# Patient Record
Sex: MALE | Race: WHITE | NOT HISPANIC OR LATINO | URBAN - METROPOLITAN AREA
[De-identification: names, ages, dates, MRNs, and addresses within clinical notes are randomized per-mention and may not be internally consistent; named-entity substitution may affect disease eponyms.]

---

## 2021-01-28 ENCOUNTER — TRANSFERRED RECORDS (OUTPATIENT)
Dept: HEALTH INFORMATION MANAGEMENT | Facility: CLINIC | Age: 71
End: 2021-01-28

## 2021-02-04 ENCOUNTER — TRANSCRIBE ORDERS (OUTPATIENT)
Dept: OTHER | Age: 71
End: 2021-02-04

## 2021-02-04 DIAGNOSIS — D03.9 LENTIGO MALIGNA (H): Primary | ICD-10-CM

## 2021-02-08 ENCOUNTER — TELEPHONE (OUTPATIENT)
Dept: DERMATOLOGY | Facility: CLINIC | Age: 71
End: 2021-02-08

## 2021-02-10 ENCOUNTER — DOCUMENTATION ONLY (OUTPATIENT)
Dept: CARE COORDINATION | Facility: CLINIC | Age: 71
End: 2021-02-10

## 2021-02-11 NOTE — TELEPHONE ENCOUNTER
FUTURE VISIT INFORMATION      FUTURE VISIT INFORMATION:    Date: 2.16.21    Time: 3:30    Location: Telephone  REFERRAL INFORMATION:    Referring provider:  Dr. Marni Tom    Referring providers clinic:  [facility]    Reason for visit/diagnosis  lentigo maligna    RECORDS REQUESTED FROM:       Clinic name Comments Records Status Photos Status   [facility] 1.28.21  Dr. Angela  Path # [id number] CE Received- sent to scanning.                                   Action 2.11.21 jm   Action Taken Faxed photo request to [facility] at [phone].     Action 2.12.21 jm   Action Taken Received photo. Sent to scanning. Also sent to Sandrita.

## 2021-02-16 ENCOUNTER — VIRTUAL VISIT (OUTPATIENT)
Dept: DERMATOLOGY | Facility: CLINIC | Age: 71
End: 2021-02-16
Payer: COMMERCIAL

## 2021-02-16 ENCOUNTER — PRE VISIT (OUTPATIENT)
Dept: DERMATOLOGY | Facility: CLINIC | Age: 71
End: 2021-02-16

## 2021-02-16 DIAGNOSIS — D03.39 LENTIGO MALIGNA OF RIGHT CHEEK (H): Primary | ICD-10-CM

## 2021-02-16 PROCEDURE — 99204 OFFICE O/P NEW MOD 45 MIN: CPT | Mod: TEL | Performed by: DERMATOLOGY

## 2021-02-16 RX ORDER — PHENYTOIN SODIUM 100 MG/1
500 CAPSULE, EXTENDED RELEASE ORAL DAILY
COMMUNITY
Start: 2020-12-01

## 2021-02-16 RX ORDER — DULOXETIN HYDROCHLORIDE 60 MG/1
60 CAPSULE, DELAYED RELEASE ORAL 2 TIMES DAILY
COMMUNITY
Start: 2020-12-01

## 2021-02-16 RX ORDER — ATORVASTATIN CALCIUM 80 MG/1
80 TABLET, FILM COATED ORAL DAILY
COMMUNITY
Start: 2020-12-01

## 2021-02-16 RX ORDER — ESCITALOPRAM OXALATE 20 MG/1
20 TABLET ORAL DAILY
COMMUNITY
Start: 2020-12-01

## 2021-02-16 RX ORDER — CLONAZEPAM 0.5 MG/1
0.5 TABLET ORAL 2 TIMES DAILY
COMMUNITY
Start: 2021-02-16

## 2021-02-16 RX ORDER — LISINOPRIL 10 MG/1
5 TABLET ORAL DAILY
COMMUNITY
Start: 2020-12-01

## 2021-02-16 NOTE — PROGRESS NOTES
[facility] Dermatology Note  Encounter Date: Feb 16, 2021  Store-and-Forward and Telephone ([phone] ). Location of teledermatologist: [facility] [place].  Start time: 1610. End time: 1620.    Dermatology Problem List:  1. 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   - Recommended micrographic surgery, patient agrees to proceed with scheduling      Follow-up: MMS in 1-2 weeks    Staff and Fellow:     Dax Spaulding MD  PGY-6    Micrographic Surgery and Dermatologic Oncology Fellow  February 16, 2021      ____________________________________________    CC: Derm Problem (Consult Lentigo maligna right cheek)    HPI:  Mr. Félix Martin is a(n) 70 year old male who presents today for consultation regarding lentigo maligna of the right preauricular cheek. He is being referred for MMS.     Patient is otherwise feeling well, without additional concerns.    Labs:  NA    Physical Exam:  No photos available.     Medications:  Current Outpatient Medications   Medication     Aspirin Buf,CaCarb-MgCarb-MgO, 81 MG TABS     atorvastatin (LIPITOR) 80 MG tablet     cholecalciferol (VITAMIN D3) 25 mcg (1000 units) capsule     clonazePAM (KLONOPIN) 0.5 MG tablet     DULoxetine (CYMBALTA) 60 MG capsule     escitalopram (LEXAPRO) 20 MG tablet     lisinopril (ZESTRIL) 10 MG tablet     phenytoin (DILANTIN) 100 MG capsule     No current facility-administered medications for this visit.       Past Medical/Surgical History:   There is no problem list on file for this patient.    No past medical history on file.    CC Marni Tom MD  [address] [place] [address] on close of this encounter.

## 2021-02-16 NOTE — NURSING NOTE
Chief Complaint   Patient presents with     Derm Problem     Consult Lentigo maligna right cheek     Charlene APODACA CMA

## 2021-02-16 NOTE — LETTER
2/16/2021       RE: Félix Martin  [address] [address] [address]     Dear Colleague,    Thank you for referring your patient, Félix Martin, to the [facility] DERMATOLOGIC SURGERY CLINIC [place] at [facility]. Please see a copy of my visit note below.    [facility] Dermatology Note  Encounter Date: Feb 16, 2021  Store-and-Forward and Telephone ([phone] ). Location of teledermatologist: [facility] [place].  Start time: 1610. End time: 1620.    Dermatology Problem List:  1. 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   - Recommended micrographic surgery, patient agrees to proceed with scheduling      Follow-up: MMS in 1-2 weeks    Staff and Fellow:     Dax Spaulding MD  PGY-6    Micrographic Surgery and Dermatologic Oncology Fellow  February 16, 2021      ____________________________________________    CC: Derm Problem (Consult Lentigo maligna right cheek)    HPI:  Mr. Félix Martin is a(n) 70 year old male who presents today for consultation regarding lentigo maligna of the right preauricular cheek. He is being referred for MMS.     Patient is otherwise feeling well, without additional concerns.    Labs:  NA    Physical Exam:  No photos available.     Medications:  Current Outpatient Medications   Medication     Aspirin Buf,CaCarb-MgCarb-MgO, 81 MG TABS     atorvastatin (LIPITOR) 80 MG tablet     cholecalciferol (VITAMIN D3) 25 mcg (1000 units) capsule     clonazePAM (KLONOPIN) 0.5 MG tablet     DULoxetine (CYMBALTA) 60 MG capsule     escitalopram (LEXAPRO) 20 MG tablet      lisinopril (ZESTRIL) 10 MG tablet     phenytoin (DILANTIN) 100 MG capsule     No current facility-administered medications for this visit.       Past Medical/Surgical History:   There is no problem list on file for this patient.    No past medical history on file.    CC Marni Tom MD  [address] [address] on close of this encounter.    Attestation signed by Emanuel Vivar MD at 2/24/2021 12:50 PM:  Attending Attestation:  I personally interviewed and examined the patient.  The patient was discussed with the resident.  I reviewed the resident note and agree with the findings.  Any edits are below.    History: MIS on cheek    PE: brown papule    A/P: MIS -- schedule Mohs.  LIkely CLC vs. Flap repair.      Emanuel Vivar M.D.  Professor  Director of Dermatologic Surgery  Department of Dermatology

## 2021-02-17 ENCOUNTER — TELEPHONE (OUTPATIENT)
Dept: DERMATOLOGY | Facility: CLINIC | Age: 71
End: 2021-02-17

## 2021-02-17 NOTE — TELEPHONE ENCOUNTER
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 No answer. Left message to call back to schedule. Clinic number was provided.

## 2021-02-18 ENCOUNTER — TRANSFERRED RECORDS (OUTPATIENT)
Dept: HEALTH INFORMATION MANAGEMENT | Facility: CLINIC | Age: 71
End: 2021-02-18

## 2021-02-22 ENCOUNTER — OFFICE VISIT (OUTPATIENT)
Dept: DERMATOLOGY | Facility: CLINIC | Age: 71
End: 2021-02-22
Payer: COMMERCIAL

## 2021-02-22 VITALS — DIASTOLIC BLOOD PRESSURE: 90 MMHG | SYSTOLIC BLOOD PRESSURE: 138 MMHG | HEART RATE: 102 BPM

## 2021-02-22 DIAGNOSIS — G89.18 POST-OPERATIVE PAIN: Primary | ICD-10-CM

## 2021-02-22 DIAGNOSIS — D03.39 MELANOMA IN SITU OF CHEEK (H): ICD-10-CM

## 2021-02-22 PROCEDURE — 17315 MOHS SURG ADDL BLOCK: CPT | Mod: GC | Performed by: DERMATOLOGY

## 2021-02-22 PROCEDURE — 14301 TIS TRNFR ANY 30.1-60 SQ CM: CPT | Mod: GC | Performed by: DERMATOLOGY

## 2021-02-22 PROCEDURE — 17311 MOHS 1 STAGE H/N/HF/G: CPT | Mod: GC | Performed by: DERMATOLOGY

## 2021-02-22 PROCEDURE — 88305 TISSUE EXAM BY PATHOLOGIST: CPT | Performed by: PATHOLOGY

## 2021-02-22 PROCEDURE — 88342 IMHCHEM/IMCYTCHM 1ST ANTB: CPT | Mod: 59 | Performed by: DERMATOLOGY

## 2021-02-22 RX ORDER — TRAMADOL HYDROCHLORIDE 50 MG/1
50 TABLET ORAL EVERY 6 HOURS PRN
Qty: 10 TABLET | Refills: 0 | Status: CANCELLED | OUTPATIENT
Start: 2021-02-22 | End: 2021-02-25

## 2021-02-22 RX ORDER — ACETAMINOPHEN 500 MG
1000 TABLET ORAL ONCE
Status: COMPLETED | OUTPATIENT
Start: 2021-02-22 | End: 2021-02-22

## 2021-02-22 RX ADMIN — Medication 1000 MG: at 14:21

## 2021-02-22 SDOH — HEALTH STABILITY: MENTAL HEALTH: HOW OFTEN DO YOU HAVE A DRINK CONTAINING ALCOHOL?: NOT ASKED

## 2021-02-22 SDOH — HEALTH STABILITY: MENTAL HEALTH: HOW OFTEN DO YOU HAVE 6 OR MORE DRINKS ON ONE OCCASION?: NOT ASKED

## 2021-02-22 SDOH — HEALTH STABILITY: MENTAL HEALTH: HOW MANY STANDARD DRINKS CONTAINING ALCOHOL DO YOU HAVE ON A TYPICAL DAY?: NOT ASKED

## 2021-02-22 ASSESSMENT — PAIN SCALES - GENERAL: PAINLEVEL: NO PAIN (0)

## 2021-02-22 NOTE — NURSING NOTE
Drug Administration Record    Prior to injection, verified patient identity using patient's name and date of birth.  Due to injection administration, patient instructed to remain in clinic for 15 minutes  afterwards, and to report any adverse reaction to me immediately.    Drug Name: Acetaminophen   Dose: 1,000 mg  Route administered: PO  NDC #: 7608-5598-33  Amount of waste(mL):0  Reason for waste: As per MD    LOT #: [id number]  SITE: oral  : major  EXPIRATION DATE: 04/2022

## 2021-02-22 NOTE — LETTER
2/22/2021       RE: Félix Martin  [address] [address] [address]     Dear Colleague,    Thank you for referring your patient, Félix Martin, to the [facility] DERMATOLOGIC SURGERY [facility] [place] at [facility]. Please see a copy of my visit note below.    [facility] Dermatologic Surgery Clinic [place] Procedure Note      Date of Service:  Feb 22, 2021  Surgery: Mohs micrographic surgery    Case 1  Repair Type: Rhombic transposition flap  Repair Size: 8.5 x 4.5 cm  Suture Material: 4-0 Vicryl, 4-0 Monocryl and 5-0 FAG  Tumor Type: Malignant melanoma in situ  Location: right preauricular cheek  Derm-Path Accession #: Outside pathology report   PreOp Size: 1.6 x 1.0 cm  PostOp Size: 4.3 x 3.5 cm  Mohs Accession #:   Level of Defect: Fat      Procedure:  We discussed the principles of treatment and most likely complications including scarring, bleeding, infection, swelling, pain, crusting, nerve damage, large wound,  incomplete excision, wound dehiscence,  nerve damage, recurrence, and a second procedure may be recommended to obtain the best cosmetic or functional result.    Informed consent was obtained and the patient underwent the procedure as follows:  The patient was placed supine on the operating table.  The cancer was identified, outlined with a marker, and verified by the patient.  The entire surgical field was prepped with Hibiclens.  The surgical site was anesthetized using Lidocaine 1% with epi 1:100,000.    The area of clinically apparent tumor was excised and sent for permanent sections to rule out invasive melanoma. The peripheral rim and deep aspect of tissue was then surgically excised using a #15 blade and was then transferred onto a specimen sheet maintaining the orientation of the specimen. Hemostasis was obtained using bipolar electrocoagulation. The wound site was then covered with a dressing while the tissue  samples were processed for examination.    The excised tissue was transported to the Mohs histology laboratory maintaining the tissue orientation.  The tissue specimen was relaxed so that the entire surgical margin was in a single horizontal plane for sectioning and inked for precise mapping.  A precise reference map was drawn to reflect the sectioning of the specimen, colored inking of the margins, and orientation on the patient. The tissue was processed using horizontal sectioning of the base and continuous peripheral margins. Vertical, bread loafed sections were obtained from the central portion of the lesion, prior to being sent for permament sections. A control biopsy from the contralateral cheek was taken to compare the density and periodicuty of melanocytes along the dermal-epidermal junction.     The tissue sections were stained with Haemotoxylin and Eosin (H&E) as well as Melanoma antigen recognized by T cells or Melan-A (MART-1) stain.T he histopathologic sections were reviewed in conjunction with the reference map.     Total blocks: 6   Total slides:  23    Within the central portion of the lesion increased density and periodicity of atypical appearing melanocytes with areas of confluence, consistent with melanoma in situ. There was a relatively normal periodicity and density of melanocytes along the dermal-epidermal junction noted at the peripheral margins, therefore Mohs surgery was complete.    PROCEDURE: Rhombic Transposition Flap    The patient was taken to the operative suite and placed supine on the operating room table.  The wound was identified and infiltrated with 1% lidocaine with epinephrine.  The defect was then cleansed and prepped with chlorhexidine and draped with sterile drapes.  Using a marker, a rhomboid transposition flap repair was planned.  The wound edges were then debeveled and the wound was undermined bluntly in all directions.  The transposition flap was incised sharply to the  level of fat.  The flap was undermined from all surrounding tissue. Hemostasis was obtained with electrocoagulation.  The flap was transposed into the primary defect.  The secondary defect and flap closed with deep dermal vicryl and monocryl sutures Epidermal tissue was carefully approximated using 5-0 FAG epidermal sutures throughout the length of the flap.  Redundant skin was excised by the triangulation technique, and closed in similar fashion.  The wound was cleansed with sterile saline and Vasline was applied. A sterile non-adherent pressure dressing was placed.  The patient left the operating suite in stable condition.  The patient will follow up via virtual visit in 1-2 weeks. Wound care was reviewed verbally and in writing.     Dr. Spaulding performed the micrographic surgery and reconstruction under the direct supervision of Dr. Emanuel Vivar, who was present for the entire micrographic surgery and key portions of the reconstruction, and always immediately available.     Dax Spaulding MD  PGY-6    Micrographic Surgery and Dermatologic Oncology Fellow  February 22, 2021            Attestation signed by Emanuel Vivar MD at 2/25/2021 10:49 AM:    Attending attestation:  I was present for key elements of the procedure and immediately available for all other portions of the procedure.  I have reviewed the note and edited it as necessary.    Emanuel Vivar M.D.  Professor  Director of Dermatologic Surgery  Department of Dermatology  [facility]    Dermatology Surgery Clinic  [facility]  [address] [place] [id number]

## 2021-02-22 NOTE — PROGRESS NOTES
[facility] Dermatologic Surgery Clinic [place] Procedure Note      Date of Service:  Feb 22, 2021  Surgery: Mohs micrographic surgery    Case 1  Repair Type: Rhombic transposition flap  Repair Size: 8.5 x 4.5 cm  Suture Material: 4-0 Vicryl, 4-0 Monocryl and 5-0 FAG  Tumor Type: Malignant melanoma in situ  Location: right preauricular cheek  Derm-Path Accession #: Outside pathology report   PreOp Size: 1.6 x 1.0 cm  PostOp Size: 4.3 x 3.5 cm  Mohs Accession #:   Level of Defect: Fat      Procedure:  We discussed the principles of treatment and most likely complications including scarring, bleeding, infection, swelling, pain, crusting, nerve damage, large wound,  incomplete excision, wound dehiscence,  nerve damage, recurrence, and a second procedure may be recommended to obtain the best cosmetic or functional result.    Informed consent was obtained and the patient underwent the procedure as follows:  The patient was placed supine on the operating table.  The cancer was identified, outlined with a marker, and verified by the patient.  The entire surgical field was prepped with Hibiclens.  The surgical site was anesthetized using Lidocaine 1% with epi 1:100,000.    The area of clinically apparent tumor was excised and sent for permanent sections to rule out invasive melanoma. The peripheral rim and deep aspect of tissue was then surgically excised using a #15 blade and was then transferred onto a specimen sheet maintaining the orientation of the specimen. Hemostasis was obtained using bipolar electrocoagulation. The wound site was then covered with a dressing while the tissue samples were processed for examination.    The excised [facility] maintaining the tissue orientation.  The tissue specimen was relaxed so that the entire surgical margin was in a single horizontal plane for sectioning and inked for precise mapping.  A precise reference map was drawn  to reflect the sectioning of the specimen, colored inking of the margins, and orientation on the patient. The tissue was processed using horizontal sectioning of the base and continuous peripheral margins. Vertical, bread loafed sections were obtained from the central portion of the lesion, prior to being sent for permament sections. A control biopsy from the contralateral cheek was taken to compare the density and periodicuty of melanocytes along the dermal-epidermal junction.     The tissue sections were stained with Haemotoxylin and Eosin (H&E) as well as Melanoma antigen recognized by T cells or Melan-A (MART-1) stain.T he histopathologic sections were reviewed in conjunction with the reference map.     Total blocks: 6   Total slides:  23    Within the central portion of the lesion increased density and periodicity of atypical appearing melanocytes with areas of confluence, consistent with melanoma in situ. There was a relatively normal periodicity and density of melanocytes along the dermal-epidermal junction noted at the peripheral margins, therefore Mohs surgery was complete.    PROCEDURE: Rhombic Transposition Flap    The patient was taken to the operative suite and placed supine on the operating room table.  The wound was identified and infiltrated with 1% lidocaine with epinephrine.  The defect was then cleansed and prepped with chlorhexidine and draped with sterile drapes.  Using a marker, a rhomboid transposition flap repair was planned.  The wound edges were then debeveled and the wound was undermined bluntly in all directions.  The transposition flap was incised sharply to the level of fat.  The flap was undermined from all surrounding tissue. Hemostasis was obtained with electrocoagulation.  The flap was transposed into the primary defect.  The secondary defect and flap closed with deep dermal vicryl and monocryl sutures Epidermal tissue was carefully approximated using 5-0 FAG epidermal sutures  throughout the length of the flap.  Redundant skin was excised by the triangulation technique, and closed in similar fashion.  The wound was cleansed with sterile saline and Vasline was applied. A sterile non-adherent pressure dressing was placed.  The patient left the operating suite in stable condition.  The patient will follow up via virtual visit in 1-2 weeks. Wound care was reviewed verbally and in writing.     Dr. Spaulding performed the micrographic surgery and reconstruction under the direct supervision of Dr. Emanuel Vivar, who was present for the entire micrographic surgery and key portions of the reconstruction, and always immediately available.     Dax Spaulding MD  PGY-6    Micrographic Surgery and Dermatologic Oncology Fellow  February 22, 2021

## 2021-02-22 NOTE — NURSING NOTE
Chief Complaint   Patient presents with     Derm Problem     Patient is here today for MOHS right preauricular cheek.     Charlene APODACA CMA

## 2021-02-22 NOTE — PATIENT INSTRUCTIONS
Wound Care Instructions  I will experience scar, altered skin color, bleeding, swelling, pain, crusting and redness. I may experience altered sensation. Risks are excessive bleeding, infection, muscle weakness, thick (hypertrophic or keloidal) scar, and recurrence,. A second procedure may be recommended to obtain the best cosmetic or functional result.  Possible complications of any surgical procedure are bleeding, infection, scarring, alteration in skin color and sensation, muscle weakness in the area, wound dehiscence or seperation, or recurrence of the lesion or disease. On occasion, after healing, a secondary procedure or revision may be recommended in order to obtain the best cosmetic or functional result.   After your surgery, a pressure bandage will be placed over the area that has sutures. This will help prevent bleeding.   For the First 48 hours After Surgery:  1. Leave the pressure bandage on and keep it dry. If it should come loose, you may retape it, but do not take it off.  2. Relax and take it easy. Do not do any vigorous exercise, heavy lifting, or bending forward. This could cause the wound to bleed.  3. Post-operative pain is usually mild. You may take plain or extra strength Tylenol every 4 hours as needed (do not take more than 4,000mg in one day). Do not take any medicine that contains aspirin, ibuprofen or motrin unless you have been recommended these by a doctor.  Avoid alcohol and vitamin E as these may increase your tendency to bleed.  4. You may put an ice pack around the bandaged area for 20 minutes every 2-3 hours. This may help reduce swelling, bruising, and pain. Make sure the ice pack is waterproof so that the pressure bandage does not get wet.   5. You may see a small amount of drainage or blood on your pressure bandage. This is normal. However, if drainage or bleeding continues or saturates the bandage, you will need to apply firm pressure over the bandage with a washcloth for 15  minutes. If bleeding continues after applying pressure for 15 minutes then go to the nearest emergency room.  48 Hours After Surgery  Carefully remove the bandage and start daily wound care and dressing changes. You may also now shower and get the wound wet. Wash wound with a mild soap and water.  Use caution when washing the wound. Be gentle and do not let the forceful shower stream hit the wound directly.  PAT dry.  Daily Wound Care:  1. Wash wound with a mild soap and water.  Use caution when washing the wound, be gentle and do not let the forceful shower stream hit the wound directly.  2. PAT DRY.  3. Apply Vaseline (from a new container or tube) over the suture line with a Q-tip. It is very important to keep the wound continuously moist, as wounds heal best in a moist environment.  4.  Keep the site covered until sutures are removed, you can cover it with a Telfa (non-stick) dressing and tape or a band-aid.    5. If you are unable to keep wound covered, you must apply Vaseline every 2 - 3 hours (while awake) to ensure it is being kept moist for optimal healing. A dressing overnight is recommended to keep the area moist.   Call Us If:  1. You have pain that is not controlled with Tylenol.  2. You have signs or symptoms of an infection, such as: fever over 100 degrees F, redness, warmth, or foul-smelling or yellow/creamy drainage from the wound.  Who should I call with questions?    [facility]: [phone]     [facility]: [phone]    For urgent needs outside of business hours call the [facility] at [phone] and ask for the dermatology resident on call

## 2021-02-26 LAB — COPATH REPORT: NORMAL

## 2021-02-26 NOTE — RESULT ENCOUNTER NOTE
ROCIO Only - I am letting the patient know the results of his debulk specimen and control biopsy. No further treatment.     Neftali

## 2021-03-07 ENCOUNTER — HEALTH MAINTENANCE LETTER (OUTPATIENT)
Age: 71
End: 2021-03-07

## 2021-03-08 ENCOUNTER — VIRTUAL VISIT (OUTPATIENT)
Dept: DERMATOLOGY | Facility: CLINIC | Age: 71
End: 2021-03-08
Payer: COMMERCIAL

## 2021-03-08 DIAGNOSIS — D03.39 MELANOMA IN SITU OF CHEEK (H): Primary | ICD-10-CM

## 2021-03-08 PROCEDURE — 99024 POSTOP FOLLOW-UP VISIT: CPT | Mod: 95 | Performed by: DERMATOLOGY

## 2021-03-08 ASSESSMENT — PAIN SCALES - GENERAL: PAINLEVEL: NO PAIN (0)

## 2021-03-08 NOTE — PROGRESS NOTES
F/U for Mohs for MIS on R cheek repaired with rhombic flap    Patient doing well s/p Mohs.  Flap looks great.  No complaints.  RTC PRN.    Emanuel Vivar MD

## 2021-03-08 NOTE — LETTER
Date:March 22, 2021      Patient was self referred, no letter generated. Do not send.        [facility] Health Information

## 2021-03-08 NOTE — LETTER
3/8/2021       RE: Félix Martin  [address] [address] [address]     Dear Colleague,    Thank you for referring your patient, Félix Martin, to the [facility] DERMATOLOGIC SURGERY CLINIC [place] at [facility]. Please see a copy of my visit note below.    F/U for Mohs for MIS on R cheek repaired with rhombic flap    Patient doing well s/p Mohs.  Flap looks great.  No complaints.  RTC PRN.    Emanuel Vivar MD        Again, thank you for allowing me to participate in the care of your patient.      Sincerely,    Emanuel Vivar MD

## 2021-03-08 NOTE — PATIENT INSTRUCTIONS
[facility] Dermatology Visit    Thank you for allowing us to participate in your care. Your findings, instructions and follow-up plan are as follows:         When should I call my doctor?    If you are worsening or not improving, please, contact us or seek urgent care as noted below.     Who should I call with questions (adults)?    [facility] (adult and pediatric): [phone]     [facility] (adult): [phone]    For urgent needs outside of business hours call the [facility] at [phone] and ask for the dermatology resident on call    If this is a medical emergency and you are unable to reach an ER, Call 911      Who should I call with questions (pediatric)?  [facility]- Pediatric Dermatology  Dr. Sravanthi London, Dr. Naomi Stevenson, Dr. Janelle Palomo, Virginia Henry, [place]  Dr. Laisha Luu, Dr. Olive He & Dr. Félix Alvarez  Non Urgent  Nurse Triage Line; [phone]- Cindy and Jenni RN Care Coordinators   Arianna (/Complex ) [phone]    If you need a prescription refill, please contact your pharmacy. Refills are approved or denied by our Physicians during normal business hours, Monday through Fridays  Per office policy, refills will not be granted if you have not been seen within the past year (or sooner depending on your child's condition)    Scheduling Information:  Pediatric Appointment Scheduling and Call Center [phone]  Radiology Scheduling- [phone]  Sedation Unit Scheduling- [phone]  [place] Scheduling- General [phone]; Pediatric Dermatology [phone]  Main  Services: [phone]  Faroese: [phone]  Martiniquais: [phone]  Hmong/Japanese/German: [phone]  Preadmission Nursing Department Fax Number: [phone] (Fax all pre-operative paperwork to this number)    For urgent matters arising during evenings,  weekends, or holidays that cannot wait for normal business hours please call [phone] and ask for the Dermatology Resident On-Call to be paged.

## 2021-03-08 NOTE — NURSING NOTE
Chief Complaint   Patient presents with     Derm Problem     Osorio has a follow telephone visit to discuss right cheek.      Charlene Chandler LPN

## 2021-10-11 ENCOUNTER — HEALTH MAINTENANCE LETTER (OUTPATIENT)
Age: 71
End: 2021-10-11

## 2022-03-27 ENCOUNTER — HEALTH MAINTENANCE LETTER (OUTPATIENT)
Age: 72
End: 2022-03-27

## 2022-09-25 ENCOUNTER — HEALTH MAINTENANCE LETTER (OUTPATIENT)
Age: 72
End: 2022-09-25

## 2022-11-09 ENCOUNTER — MEDICAL CORRESPONDENCE (OUTPATIENT)
Dept: HEALTH INFORMATION MANAGEMENT | Facility: CLINIC | Age: 72
End: 2022-11-09

## 2022-11-14 ENCOUNTER — TRANSCRIBE ORDERS (OUTPATIENT)
Dept: OTHER | Age: 72
End: 2022-11-14

## 2022-11-14 DIAGNOSIS — D03.4 MELANOMA IN SITU OF SCALP (H): Primary | ICD-10-CM

## 2022-11-14 DIAGNOSIS — Z86.006 HX OF MELANOMA IN SITU: ICD-10-CM

## 2022-11-15 ENCOUNTER — TELEPHONE (OUTPATIENT)
Dept: DERMATOLOGY | Facility: CLINIC | Age: 72
End: 2022-11-15

## 2022-11-15 ENCOUNTER — MYC MEDICAL ADVICE (OUTPATIENT)
Dept: DERMATOLOGY | Facility: CLINIC | Age: 72
End: 2022-11-15

## 2022-11-15 NOTE — TELEPHONE ENCOUNTER
Left patient a voicemail to schedule a consult for Melanoma in situ of scalp with Dr. Vivar. Provided my direct phone number.    Isabela Chahal on 11/15/2022 at 10:10 AM

## 2022-11-16 ENCOUNTER — PRE VISIT (OUTPATIENT)
Dept: DERMATOLOGY | Facility: CLINIC | Age: 72
End: 2022-11-16

## 2022-11-16 ENCOUNTER — VIRTUAL VISIT (OUTPATIENT)
Dept: DERMATOLOGY | Facility: CLINIC | Age: 72
End: 2022-11-16
Payer: COMMERCIAL

## 2022-11-16 DIAGNOSIS — D03.4 MELANOMA IN SITU OF SCALP (H): Primary | ICD-10-CM

## 2022-11-16 PROCEDURE — 99213 OFFICE O/P EST LOW 20 MIN: CPT | Mod: GC | Performed by: DERMATOLOGY

## 2022-11-16 ASSESSMENT — PAIN SCALES - GENERAL: PAINLEVEL: NO PAIN (0)

## 2022-11-16 NOTE — Clinical Note
Patient needs scheduled for melanoma Mohs for MIS of scalp vertex. OK to schedule in open slot in December if still available. If no availability, please let me know. Thanks!  POWER

## 2022-11-16 NOTE — LETTER
Date:November 23, 2022      Provider requested that no letter be sent. Do not send.       [facility]

## 2022-11-16 NOTE — TELEPHONE ENCOUNTER
FUTURE VISIT INFORMATION      FUTURE VISIT INFORMATION:    Date: 11.16.22    Time: 2:00    Location: Telephone  REFERRAL INFORMATION:    Referring provider:  Negro    Referring providers clinic:  [facility]    Reason for visit/diagnosis  Melanoma in situ of scalp. need melanoma slot.  return pt    RECORDS REQUESTED FROM:       Clinic name Comments Records Status Imaging Status   [facility] 10.27.22  Path # [id number] DAVIS CE

## 2022-11-16 NOTE — LETTER
11/16/2022       RE: Félix aMrtin  [address] [address] [address]     Dear Colleague,    Thank you for referring your patient, Félix Martin, to the [facility] DERMATOLOGIC SURGERY CLINIC [place] at [facility]. Please see a copy of my visit note below.    Mohs Micrographic Surgery Consult Note    Nov 16, 2022  Start time (if telephone encounter): 2:00 p.m.  End time (if telephone encounter): 2:15 p.m.    Dermatology Problem List:  1. History of melanoma  - LM, R cheek, s/p Mohs 2/22/21  - LM, scalp vertex, s/p bx 10/27/22    Subjective: The patient is a 72 year old man who presents today for Mohs micrographic surgery consultation for a recent diagnosis of skin cancer.    Skin cancer(s): Melanoma in situ  Location(s): scalp vertex  Associated symptoms: none  Onset: within last 1 year    No other associated symptoms, modifying factors, or prior treatments, except when noted above. The patient denies any constitutional symptoms, lymphadenopathy, unintentional weight loss or decreased appetite. No other skin concerns today.    Objective:   Skin: Focused examination of the scalp within the teledermatology photograph(s) on 10/27/22 was performed.   - 0.4 cm brown macule on L scalp vertex    Assessment and Plan:     1. Plan for Mohs micrographic surgery for skin cancer(s) above:  - We discussed the nature of the diagnosis/condition above. We discussed the treatment options, including the risks benefits and expectations of these options. We recommend micrographic surgery as the most effective and most tissue sparing option for treatment, and the patient agrees to proceed with this.  The patient is aware of the risks, benefits and expectations of this procedure. The patient will be scheduled for this procedure, if not already done so.  - We anticipate the following closure type: Linear closure, such as complex linear closure (CLC)    The patient was  discussed with and evaluated by attending physician, Emanuel Vivar MD.    Hector Calle MD  Dermatology, PGY-5  Mohs surgery fellow    Attending Attestation  I attest that the Fellow recorded the interview and exam that I personally performed.  I have reviewed the note and edited it as necessary.    Emanuel Vivar M.D.  Professor  Director of Dermatologic Surgery  Department of Dermatology  [facility]        Again, thank you for allowing me to participate in the care of your patient.      Sincerely,    Emanuel Vivar MD

## 2022-11-16 NOTE — PROGRESS NOTES
Mohs Micrographic Surgery Consult Note    Nov 16, 2022  Start time (if telephone encounter): 2:00 p.m.  End time (if telephone encounter): 2:15 p.m.    Dermatology Problem List:  1. History of melanoma  - LM, R cheek, s/p Mohs 2/22/21  - LM, scalp vertex, s/p bx 10/27/22    Subjective: The patient is a 72 year old man who presents today for Mohs micrographic surgery consultation for a recent diagnosis of skin cancer.    Skin cancer(s): Melanoma in situ  Location(s): scalp vertex  Associated symptoms: none  Onset: within last 1 year    No other associated symptoms, modifying factors, or prior treatments, except when noted above. The patient denies any constitutional symptoms, lymphadenopathy, unintentional weight loss or decreased appetite. No other skin concerns today.    Objective:   Skin: Focused examination of the scalp within the teledermatology photograph(s) on 10/27/22 was performed.   - 0.4 cm brown macule on L scalp vertex    Assessment and Plan:     1. Plan for Mohs micrographic surgery for skin cancer(s) above:  - We discussed the nature of the diagnosis/condition above. We discussed the treatment options, including the risks benefits and expectations of these options. We recommend micrographic surgery as the most effective and most tissue sparing option for treatment, and the patient agrees to proceed with this.  The patient is aware of the risks, benefits and expectations of this procedure. The patient will be scheduled for this procedure, if not already done so.  - We anticipate the following closure type: Linear closure, such as complex linear closure (CLC)    The patient was discussed with and evaluated by attending physician, Emanuel Vivar MD.    Hector Calle MD  Dermatology, PGY-5  Mohs surgery fellow    Attending Attestation  I attest that the Fellow recorded the interview and exam that I personally performed.  I have reviewed the note and edited it as necessary.    Emanuel Vivar  M.D.  Professor  Director of Dermatologic Surgery  Department of Dermatology  [facility]

## 2022-11-16 NOTE — NURSING NOTE
Chief Complaint   Patient presents with     Derm Problem     Patient is here today for melanoma in situ of scalp Mohs consultation.     Gabriella SIDDIQUI RN

## 2022-11-18 NOTE — TELEPHONE ENCOUNTER
Left patient a voicemail to schedule a mohs for Melanoma in situ of scalp with Dr. Vivar. Provided my direct phone number.    Isabela Chahal, Procedure  11/18/2022 9:09 AM

## 2022-11-18 NOTE — TELEPHONE ENCOUNTER
Called patient to schedule surgery with Dr. Vivar    Date of Surgery: 01/04    Surgery type: mohs    Consult scheduled: Yes    Has patient had mohs with us before? No    Additional comments: pt declined December appt and asked for JanuaryParisa Cahhal on 11/18/2022 at 1:20 PM

## 2023-01-09 ENCOUNTER — TELEPHONE (OUTPATIENT)
Dept: DERMATOLOGY | Facility: CLINIC | Age: 73
End: 2023-01-09

## 2023-01-09 NOTE — TELEPHONE ENCOUNTER
Called pt back. Left another vm with my direct ph#    Isabela Chahal, Procedure  1/9/2023 12:16 PM
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
## 2023-01-11 ENCOUNTER — TELEPHONE (OUTPATIENT)
Dept: DERMATOLOGY | Facility: CLINIC | Age: 73
End: 2023-01-11
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
## 2023-01-11 NOTE — TELEPHONE ENCOUNTER
Called and spoke with pt. Scheduled for February 6.     Isabela Chahal, Procedure  1/11/2023 11:31 AM

## 2023-01-30 ENCOUNTER — HEALTH MAINTENANCE LETTER (OUTPATIENT)
Age: 73
End: 2023-01-30

## 2023-02-06 ENCOUNTER — OFFICE VISIT (OUTPATIENT)
Dept: DERMATOLOGY | Facility: CLINIC | Age: 73
End: 2023-02-06
Payer: COMMERCIAL

## 2023-02-06 VITALS — SYSTOLIC BLOOD PRESSURE: 132 MMHG | DIASTOLIC BLOOD PRESSURE: 76 MMHG | HEART RATE: 87 BPM

## 2023-02-06 DIAGNOSIS — D03.4 MELANOMA IN SITU OF SCALP (H): Primary | ICD-10-CM

## 2023-02-06 PROCEDURE — 88342 IMHCHEM/IMCYTCHM 1ST ANTB: CPT | Mod: TC | Performed by: DERMATOLOGY

## 2023-02-06 PROCEDURE — 88305 TISSUE EXAM BY PATHOLOGIST: CPT | Mod: 26 | Performed by: PATHOLOGY

## 2023-02-06 PROCEDURE — 88342 IMHCHEM/IMCYTCHM 1ST ANTB: CPT | Mod: 26 | Performed by: PATHOLOGY

## 2023-02-06 PROCEDURE — 14041 TIS TRNFR F/C/C/M/N/A/G/H/F: CPT | Mod: GC | Performed by: DERMATOLOGY

## 2023-02-06 PROCEDURE — 88314 HISTOCHEMICAL STAINS ADD-ON: CPT | Mod: 59 | Performed by: DERMATOLOGY

## 2023-02-06 PROCEDURE — 17311 MOHS 1 STAGE H/N/HF/G: CPT | Mod: 51 | Performed by: DERMATOLOGY

## 2023-02-06 ASSESSMENT — PAIN SCALES - GENERAL: PAINLEVEL: NO PAIN (0)

## 2023-02-06 NOTE — NURSING NOTE
Chief Complaint   Patient presents with     Mohs      Patient is here today for mohs on scalp.      Charlene APODACA CMA

## 2023-02-06 NOTE — PROGRESS NOTES
[facility] Mohs Surgery Procedure Note    Case #: 1  Date of Service:  Feb 6, 2023  Surgery: Mohs micrographic surgery (MMS)  Staff surgeon: Emanuel Vivar MD  Fellow surgeon:Hector Calle MD  Resident surgeon: Trevin Magaña MD  Nurse: Charlene Jc CMA    Tumor Type: Melanoma in situ (MIS)  Location: vertex scalp  Derm-Path Accession #: [id number]    Mohs Accession #:   Pre-Op Size: 0.9 cm x 0.7 cm  Final Defect Size: 2.0 cm x 2.0 cm  Number of Mohs stages: 1  Level of Defect: Fascia  Local anesthetic: 6 mL 1% lidocaine with epinephrine  Repair Type: Transposition flap  Repair Size: 4.3 x 5.0 cm  Suture Material: 4-0 Monocryl; 5-0 fast absorbing gut      Wide Local Excision for Primary Cutaneous Melanoma - Excision 1 (Scalp)  Operation performed with curative intent Yes   Original Breslow thickness of the lesion Melanoma in situ (MIS)   Clinical margin width 0.5 cm   Depth of excision Other Excision extended to fascia given anatomic location and to facilitate ease of closure       Procedure:    Stage I  We discussed the principles of treatment and most likely complications including scarring, bleeding, infection, swelling, pain, crusting, nerve damage, large wound,  incomplete excision, wound dehiscence,  nerve damage, recurrence, and a second procedure may be recommended to obtain the best cosmetic or functional result.    Informed consent was obtained and the patient underwent the procedure as follows:  The patient was placed supine on the operating table.  The cancer was identified, outlined with a marker, and verified by the patient.  The entire surgical field was prepped with chlorhexidine.  The surgical site was anesthetized using lidocaine with epinephrine.    The area of clinically apparent tumor was excised and sent for permanent sections to rule out invasive melanoma. The peripheral rim of tissue was then surgically excised using a #15 blade and was then transferred onto a  specimen sheet maintaining the orientation of the specimen. Hemostasis was obtained using bipolar electrocoagulation. The wound site was then covered with a dressing while the tissue samples were processed for examination.    The excised tissue was transported to the Mohs histology laboratory maintaining the tissue orientation.  The tissue specimen was relaxed so that the entire surgical margin was in a a single horizontal plane for sectioning and inked for precise mapping.  A precise reference map was drawn to reflect the sectioning of the specimen, colored inking of the margins, and orientation on the patient. The tissue was processed using horizontal sectioning of the base and continuous peripheral margins. Vertical, bread loafed sections were obtained from the central portion of the lesion, prior to being sent for permament sections. A control biopsy  from the left anterior parietal scalp was taken to compare the density and periodicity of melanocytes along the dermal-epidermal junction.     The tissue sections were stained with Hematoxylin and Eosin (H&E), as well as Melanoma antigen recognized by T cells or Melan-A (MART-1) stain. The histopathologic sections were reviewed in conjunction with the reference map.     Total blocks: 3   Total slides:  13    Within the central portion of the lesion, there was increased density and periodicity of atypical-appearing melanocytes with areas of confluence at the epidermis, consistent with diagnosis above.    Was the skin lesion clear at this stage?: Yes, the skin lesion was determined clear at periphery at this stage: There was a relatively normal periodicity and density of melanocytes along the dermal-epidermal junction noted at the peripheral margins, therefore Mohs surgery was complete.    PROCEDURE: Rhombic Transposition Flap    The patient was taken to the operative suite and placed supine on the operating room table.  The wound was identified and infiltrated with  lidocaine with epinephrine.  The defect was then cleansed and prepped with chlorhexidine and draped with sterile drapes.  Using a marker, a rhomboid transposition flap was planned.  The wound edges were then debeveled and the wound was undermined bluntly in all directions.  The transposition flap was incised sharply to the level of fat.  The flap was undermined from all surrounding tissue. Hemostasis was obtained with electrocoagulation.  The flap was transposed into the primary defect. The secondary defect was closed with deep dermal 4-0 Monocryl sutures. Epidermal tissue was carefully approximated using 5-0 fast absorbing gut sutures in a simple running fashion throughout the length of the flap.  Any redundant skin was excised by the triangulation technique, and closed in similar fashion.  The wound was cleansed with sterile saline and Vaseline was applied. A sterile non-adherent pressure dressing was placed.  The patient left the operating suite in stable condition. Wound care was reviewed verbally and in writing.    Follow-up for suture removal: Not applicable as only dissolving sutures used    Emanuel Vivar MD was immediately available for the entire surgery and was physicially present for the key portions of the procedure.    Dr. Hector Calle (Mohs micrographic surgery fellow) performed the Mohs micrographic surgery and reconstruction under the direct supervision of Emanuel Vivar MD, who was present for the entire micrographic surgery and key portions of the reconstruction, and always immediately available.    Hector Calle MD  Dermatology, PGY-5  Mohs surgery fellow    Scribe Disclosure:  TRAM WILLARD, am serving as a scribe to document services personally performed by Emanuel Vivar MD based on data collection and the provider's statements to me.       Attending attestation:  I was present for key elements of the procedure and immediately available for all other portions of the procedure.  I have reviewed the  note and edited it as necessary.    Emanuel Vivar M.D.  Professor  Director of Dermatologic Surgery  Department of Dermatology  [facility]    Dermatology Surgery Clinic  [facility] [place] [phone]

## 2023-02-06 NOTE — PATIENT INSTRUCTIONS

## 2023-02-06 NOTE — LETTER
2/6/2023       RE: Félix Martin  [address] [address] [address]     Dear Colleague,    Thank you for referring your patient, Félix Martin, to the [facility] DERMATOLOGIC SURGERY CLINIC [place] at [facility]. Please see a copy of my visit note below.    [facility] Mohs Surgery Procedure Note    Case #: 1  Date of Service:  Feb 6, 2023  Surgery: Mohs micrographic surgery (MMS)  Staff surgeon: Emanuel Vivar MD  Fellow surgeon:Hector Calle MD  Resident surgeon: Trevin Magaña MD  Nurse: Charlene Jc CMA    Tumor Type: Melanoma in situ (MIS)  Location: vertex scalp  Derm-Path Accession #: [id number]    Mohs Accession #:   Pre-Op Size: 0.9 cm x 0.7 cm  Final Defect Size: 2.0 cm x 2.0 cm  Number of Mohs stages: 1  Level of Defect: Fascia  Local anesthetic: 6 mL 1% lidocaine with epinephrine  Repair Type: Transposition flap  Repair Size: 4.3 x 5.0 cm  Suture Material: 4-0 Monocryl; 5-0 fast absorbing gut    Procedure:    Stage I  We discussed the principles of treatment and most likely complications including scarring, bleeding, infection, swelling, pain, crusting, nerve damage, large wound,  incomplete excision, wound dehiscence,  nerve damage, recurrence, and a second procedure may be recommended to obtain the best cosmetic or functional result.    Informed consent was obtained and the patient underwent the procedure as follows:  The patient was placed supine on the operating table.  The cancer was identified, outlined with a marker, and verified by the patient.  The entire surgical field was prepped with chlorhexidine.  The surgical site was anesthetized using lidocaine with epinephrine.    The area of clinically apparent tumor was excised and sent for permanent sections to rule out invasive melanoma. The peripheral rim of tissue was then surgically excised using a #15 blade and was then transferred onto a specimen  sheet maintaining the orientation of the specimen. Hemostasis was obtained using bipolar electrocoagulation. The wound site was then covered with a dressing while the tissue samples were processed for examination.    The excised tissue was transported to the Mohs histology laboratory maintaining the tissue orientation.  The tissue specimen was relaxed so that the entire surgical margin was in a a single horizontal plane for sectioning and inked for precise mapping.  A precise reference map was drawn to reflect the sectioning of the specimen, colored inking of the margins, and orientation on the patient. The tissue was processed using horizontal sectioning of the base and continuous peripheral margins. Vertical, bread loafed sections were obtained from the central portion of the lesion, prior to being sent for permament sections. A control biopsy  from the left anterior parietal scalp was taken to compare the density and periodicity of melanocytes along the dermal-epidermal junction.     The tissue sections were stained with Hematoxylin and Eosin (H&E), as well as Melanoma antigen recognized by T cells or Melan-A (MART-1) stain. The histopathologic sections were reviewed in conjunction with the reference map.     Total blocks: 3   Total slides:  13    Within the central portion of the lesion, there was increased density and periodicity of atypical-appearing melanocytes with areas of confluence at the epidermis, consistent with diagnosis above.    Was the skin lesion clear at this stage?: Yes, the skin lesion was determined clear at periphery at this stage: There was a relatively normal periodicity and density of melanocytes along the dermal-epidermal junction noted at the peripheral margins, therefore Mohs surgery was complete.    PROCEDURE: Rhombic Transposition Flap    The patient was taken to the operative suite and placed supine on the operating room table.  The wound was identified and infiltrated with lidocaine  with epinephrine.  The defect was then cleansed and prepped with chlorhexidine and draped with sterile drapes.  Using a marker, a rhomboid transposition flap was planned.  The wound edges were then debeveled and the wound was undermined bluntly in all directions.  The transposition flap was incised sharply to the level of fat.  The flap was undermined from all surrounding tissue. Hemostasis was obtained with electrocoagulation.  The flap was transposed into the primary defect. The secondary defect was closed with deep dermal 4-0 Monocryl sutures. Epidermal tissue was carefully approximated using 5-0 fast absorbing gut sutures in a simple running fashion throughout the length of the flap.  Any redundant skin was excised by the triangulation technique, and closed in similar fashion.  The wound was cleansed with sterile saline and Vaseline was applied. A sterile non-adherent pressure dressing was placed.  The patient left the operating suite in stable condition. Wound care was reviewed verbally and in writing.    Follow-up for suture removal: Not applicable as only dissolving sutures used    Emanuel Vivar MD was immediately available for the entire surgery and was physicially present for the key portions of the procedure.    Dr. Hector Calle (Mohs micrographic surgery fellow) performed the Mohs micrographic surgery and reconstruction under the direct supervision of Emanuel Vivar MD, who was present for the entire micrographic surgery and key portions of the reconstruction, and always immediately available.    Hector Calle MD  Dermatology, PGY-5  Mohs surgery fellow    Scribe Disclosure:  TRAM WILLARD, am serving as a scribe to document services personally performed by Emanuel Vivar MD based on data collection and the provider's statements to me.       Attending attestation:  I was present for key elements of the procedure and immediately available for all other portions of the procedure.  I have reviewed the note and  edited it as necessary.    Emanuel Vivar M.D.  Professor  Director of Dermatologic Surgery  Department of Dermatology  [facility]    Dermatology Surgery Clinic  [facility] and Surgery [place] [phone]

## 2023-02-08 ENCOUNTER — TELEPHONE (OUTPATIENT)
Dept: DERMATOLOGY | Facility: CLINIC | Age: 73
End: 2023-02-08
Payer: COMMERCIAL

## 2023-02-08 LAB
PATH REPORT.COMMENTS IMP SPEC: NORMAL
PATH REPORT.COMMENTS IMP SPEC: NORMAL
PATH REPORT.FINAL DX SPEC: NORMAL
PATH REPORT.GROSS SPEC: NORMAL
PATH REPORT.MICROSCOPIC SPEC OTHER STN: NORMAL
PATH REPORT.RELEVANT HX SPEC: NORMAL

## 2023-02-08 NOTE — TELEPHONE ENCOUNTER
Follow up call completed following procedure with Dr. Vivar.    The following questions were asked:    What are you doing to manage your pain? otc  Is it helping? yes  Are you applying ice?  no  Have you had any noticeable bleeding through the bandage? no   Do you have any concerns? no         Please call [phone] option 3 if you have any additional questions.

## 2023-05-10 ENCOUNTER — VIRTUAL VISIT (OUTPATIENT)
Dept: DERMATOLOGY | Facility: CLINIC | Age: 73
End: 2023-05-10
Payer: COMMERCIAL

## 2023-05-10 DIAGNOSIS — D03.4 MELANOMA IN SITU OF SCALP (H): ICD-10-CM

## 2023-05-10 DIAGNOSIS — Z51.89 VISIT FOR WOUND CHECK: ICD-10-CM

## 2023-05-10 DIAGNOSIS — L90.5 SCAR: Primary | ICD-10-CM

## 2023-05-10 PROCEDURE — 99024 POSTOP FOLLOW-UP VISIT: CPT | Mod: 95 | Performed by: DERMATOLOGY

## 2023-05-10 ASSESSMENT — PAIN SCALES - GENERAL: PAINLEVEL: NO PAIN (0)

## 2023-05-10 NOTE — NURSING NOTE
Chief Complaint   Patient presents with     Derm Problem     Patient is here today regarding 3 month scar evaluation on scalp.     Gabriella SIDDIQUI RN

## 2023-05-10 NOTE — LETTER
5/10/2023       RE: Félix Martin  [address] [address] [address]     Dear Colleague,    Thank you for referring your patient, Félix Martin, to the [facility] DERMATOLOGIC SURGERY CLINIC [place] at [facility]. Please see a copy of my visit note below.    Dermatologic Surgery Clinic Note - Teledermatology Visit    May 10, 2023  Start time: 1:30 p.m.  End time: 1:40 p.m.    Dermatology Problem List:  1. History of melanoma  - LM, R cheek, s/p MMS 2/22/21  - LM, scalp vertex, s/p MMS 2/6/2023    Subjective: The patient is a 73 year old man who presents today for follow-up after recent dermatologic surgery/wound check.     Patient reports uncomplicated healing at recent surgical site on scalp. Patient is satisfied with the cosmetic appearance and denies symptoms along scar.    No other associated symptoms, modifying factors, or prior treatments, except when noted above. The patient denies any constitutional symptoms, lymphadenopathy, unintentional weight loss or decreased appetite. No other skin concerns today.    Objective:   Skin: Focused examination of the scalp within the teledermatology photograph(s) on 5/3/2023 was performed.   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 No evidence of infection on examination today.  - The patient was told to continue with wound cares until the area(s) is/are no longer crusted.   - The patient should follow up with dermatologic surgery PRN, as well as continue with regular skin exams in general dermatology clinic.    The patient was discussed with and evaluated by attending physician, Emanuel Vivar MD.    Hector Calle MD  Dermatology, PGY-5  Mohs surgery fellow    Scribe Disclosure:  MONTSE, Fabian Langston, am serving as a scribe to document services personally  performed by Emanuel Vivar MD based on data collection and the provider's statements to me.     Attending Attestation  I attest that the Fellow recorded the interview and exam that I personally performed.  I have reviewed the note and edited it as necessary.    Emanuel Vivar M.D.  Professor  Director of Dermatologic Surgery  Department of Dermatology  [facility]

## 2023-05-10 NOTE — PATIENT INSTRUCTIONS
[facility] Dermatology Visit    Thank you for allowing us to participate in your care. Your findings, instructions and follow-up plan are as follows:         When should I call my doctor?  If you are worsening or not improving, please, contact us or seek urgent care as noted below.     Who should I call with questions (adults)?  [facility] (adult and pediatric): [phone]  [facility] (adult): [phone]  For urgent needs outside of business hours call the [facility] at [phone] and ask for the dermatology resident on call  If this is a medical emergency and you are unable to reach an ER, Call 911    Who should I call with questions (pediatric)?  [facility]- Pediatric Dermatology  Dr. Sravanthi London, Dr. Naoim Stevenson, Dr. Janelle Palomo, Virginia Henry, PA  Dr. Laisha Luu, Dr. Olive He & Dr. Félix Alvarez  Non Urgent  Nurse Triage Line; [phone]- Cindy and Jenni RN Care Coordinators   Arianna (/Complex ) [phone]    If you need a prescription refill, please contact your pharmacy. Refills are approved or denied by our physicians during normal business hours, Monday through Fridays  Per office policy, refills will not be granted if you have not been seen within the past year (or sooner depending on your child's condition).    Scheduling Information:  Pediatric Appointment Scheduling and Call Center [phone]  Radiology Scheduling- [phone]  Sedation Unit Scheduling- [phone]  [place] Scheduling- General [phone]; Pediatric Dermatology [phone]  Main  Services: [phone]  Lao: [phone]  Mexican: [phone]  Hmong/Titi/Cymro: [phone]  Preadmission Nursing Department Fax Number: [phone] (fax all pre-operative paperwork to this number)    For urgent matters arising during evenings, weekends, or  holidays that cannot wait for normal business hours please call [phone] and ask for the dermatology resident on call to be paged.

## 2023-05-10 NOTE — PROGRESS NOTES
Dermatologic Surgery Clinic Note - Teledermatology Visit    May 10, 2023  Start time: 1:30 p.m.  End time: 1:40 p.m.    Dermatology Problem List:  1. History of melanoma  - LM, R cheek, s/p MMS 2/22/21  - LM, scalp vertex, s/p MMS 2/6/2023    Subjective: The patient is a 73 year old man who presents today for follow-up after recent dermatologic surgery/wound check.     Patient reports uncomplicated healing at recent surgical site on scalp. Patient is satisfied with the cosmetic appearance and denies symptoms along scar.    No other associated symptoms, modifying factors, or prior treatments, except when noted above. The patient denies any constitutional symptoms, lymphadenopathy, unintentional weight loss or decreased appetite. No other skin concerns today.    Objective:   Skin: Focused examination of the scalp within the teledermatology photograph(s) on 5/3/2023 was performed.   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 No evidence of infection on examination today.  - The patient was told to continue with wound cares until the area(s) is/are no longer crusted.   - The patient should follow up with dermatologic surgery PRN, as well as continue with regular skin exams in general dermatology clinic.    The patient was discussed with and evaluated by attending physician, Emanuel Vivar MD.    Hector Calle MD  Dermatology, PGY-5  Mohs surgery fellow    Scribe Disclosure:  I, Fabian Langston, am serving as a scribe to document services personally performed by Emanuel Vivar MD based on data collection and the provider's statements to me.     Attending Attestation  I attest that the Fellow recorded the interview and exam that I personally performed.  I have reviewed the note and edited it as necessary.    Emanuel Vivar M.D.  Professor  Director of Dermatologic Surgery  Department  of Dermatology  [facility]

## 2024-03-02 ENCOUNTER — HEALTH MAINTENANCE LETTER (OUTPATIENT)
Age: 74
End: 2024-03-02

## 2025-05-17 ENCOUNTER — HEALTH MAINTENANCE LETTER (OUTPATIENT)
Age: 75
End: 2025-05-17

## (undated) RX ORDER — ACETAMINOPHEN 500 MG
TABLET ORAL
Status: DISPENSED
Start: 2021-02-22